# Patient Record
Sex: MALE | URBAN - METROPOLITAN AREA
[De-identification: names, ages, dates, MRNs, and addresses within clinical notes are randomized per-mention and may not be internally consistent; named-entity substitution may affect disease eponyms.]

---

## 2024-05-25 ENCOUNTER — INPATIENT (INPATIENT)
Facility: HOSPITAL | Age: 49
LOS: 0 days | Discharge: HOME | DRG: 712 | End: 2024-05-25
Attending: UROLOGY | Admitting: UROLOGY
Payer: COMMERCIAL

## 2024-05-25 ENCOUNTER — EMERGENCY (EMERGENCY)
Facility: HOSPITAL | Age: 49
LOS: 1 days | Discharge: ANOTHER HOSPITAL | End: 2024-05-25
Attending: EMERGENCY MEDICINE | Admitting: EMERGENCY MEDICINE
Payer: COMMERCIAL

## 2024-05-25 ENCOUNTER — TRANSCRIPTION ENCOUNTER (OUTPATIENT)
Age: 49
End: 2024-05-25

## 2024-05-25 ENCOUNTER — APPOINTMENT (OUTPATIENT)
Dept: AFTER HOURS CARE | Facility: EMERGENCY ROOM | Age: 49
End: 2024-05-25

## 2024-05-25 VITALS
HEART RATE: 68 BPM | RESPIRATION RATE: 17 BRPM | OXYGEN SATURATION: 96 % | DIASTOLIC BLOOD PRESSURE: 55 MMHG | SYSTOLIC BLOOD PRESSURE: 114 MMHG

## 2024-05-25 VITALS
DIASTOLIC BLOOD PRESSURE: 89 MMHG | WEIGHT: 197.09 LBS | OXYGEN SATURATION: 97 % | HEIGHT: 73 IN | HEART RATE: 69 BPM | TEMPERATURE: 98 F | SYSTOLIC BLOOD PRESSURE: 122 MMHG | RESPIRATION RATE: 16 BRPM

## 2024-05-25 VITALS
RESPIRATION RATE: 16 BRPM | TEMPERATURE: 97 F | SYSTOLIC BLOOD PRESSURE: 146 MMHG | OXYGEN SATURATION: 95 % | HEART RATE: 60 BPM | WEIGHT: 195.11 LBS | DIASTOLIC BLOOD PRESSURE: 84 MMHG | HEIGHT: 73 IN

## 2024-05-25 VITALS
HEART RATE: 70 BPM | SYSTOLIC BLOOD PRESSURE: 147 MMHG | TEMPERATURE: 98 F | RESPIRATION RATE: 16 BRPM | DIASTOLIC BLOOD PRESSURE: 77 MMHG | OXYGEN SATURATION: 98 %

## 2024-05-25 DIAGNOSIS — N44.00 TORSION OF TESTIS, UNSPECIFIED: ICD-10-CM

## 2024-05-25 DIAGNOSIS — Z78.9 OTHER SPECIFIED HEALTH STATUS: Chronic | ICD-10-CM

## 2024-05-25 LAB
ANION GAP SERPL CALC-SCNC: 12 MMOL/L — SIGNIFICANT CHANGE UP (ref 9–16)
APPEARANCE UR: CLEAR — SIGNIFICANT CHANGE UP
APTT BLD: 29.9 SEC — SIGNIFICANT CHANGE UP (ref 24.5–35.6)
BASOPHILS # BLD AUTO: 0.03 K/UL — SIGNIFICANT CHANGE UP (ref 0–0.2)
BASOPHILS NFR BLD AUTO: 0.2 % — SIGNIFICANT CHANGE UP (ref 0–2)
BILIRUB UR-MCNC: NEGATIVE — SIGNIFICANT CHANGE UP
BLD GP AB SCN SERPL QL: NEGATIVE — SIGNIFICANT CHANGE UP
BUN SERPL-MCNC: 19 MG/DL — SIGNIFICANT CHANGE UP (ref 7–23)
CALCIUM SERPL-MCNC: 9.1 MG/DL — SIGNIFICANT CHANGE UP (ref 8.5–10.5)
CHLORIDE SERPL-SCNC: 107 MMOL/L — SIGNIFICANT CHANGE UP (ref 96–108)
CO2 SERPL-SCNC: 24 MMOL/L — SIGNIFICANT CHANGE UP (ref 22–31)
COLOR SPEC: YELLOW — SIGNIFICANT CHANGE UP
CREAT SERPL-MCNC: 1.25 MG/DL — SIGNIFICANT CHANGE UP (ref 0.5–1.3)
DIFF PNL FLD: NEGATIVE — SIGNIFICANT CHANGE UP
EGFR: 71 ML/MIN/1.73M2 — SIGNIFICANT CHANGE UP
EOSINOPHIL # BLD AUTO: 0.04 K/UL — SIGNIFICANT CHANGE UP (ref 0–0.5)
EOSINOPHIL NFR BLD AUTO: 0.3 % — SIGNIFICANT CHANGE UP (ref 0–6)
GLUCOSE SERPL-MCNC: 111 MG/DL — HIGH (ref 70–99)
GLUCOSE UR QL: NEGATIVE MG/DL — SIGNIFICANT CHANGE UP
HCT VFR BLD CALC: 42.6 % — SIGNIFICANT CHANGE UP (ref 39–50)
HGB BLD-MCNC: 14.8 G/DL — SIGNIFICANT CHANGE UP (ref 13–17)
IMM GRANULOCYTES NFR BLD AUTO: 0.2 % — SIGNIFICANT CHANGE UP (ref 0–0.9)
INR BLD: 1.05 — SIGNIFICANT CHANGE UP (ref 0.85–1.18)
KETONES UR-MCNC: 80 MG/DL
LEUKOCYTE ESTERASE UR-ACNC: NEGATIVE — SIGNIFICANT CHANGE UP
LYMPHOCYTES # BLD AUTO: 19.8 % — SIGNIFICANT CHANGE UP (ref 13–44)
LYMPHOCYTES # BLD AUTO: 2.43 K/UL — SIGNIFICANT CHANGE UP (ref 1–3.3)
MCHC RBC-ENTMCNC: 31.9 PG — SIGNIFICANT CHANGE UP (ref 27–34)
MCHC RBC-ENTMCNC: 34.7 GM/DL — SIGNIFICANT CHANGE UP (ref 32–36)
MCV RBC AUTO: 91.8 FL — SIGNIFICANT CHANGE UP (ref 80–100)
MONOCYTES # BLD AUTO: 0.65 K/UL — SIGNIFICANT CHANGE UP (ref 0–0.9)
MONOCYTES NFR BLD AUTO: 5.3 % — SIGNIFICANT CHANGE UP (ref 2–14)
NEUTROPHILS # BLD AUTO: 9.11 K/UL — HIGH (ref 1.8–7.4)
NEUTROPHILS NFR BLD AUTO: 74.2 % — SIGNIFICANT CHANGE UP (ref 43–77)
NITRITE UR-MCNC: NEGATIVE — SIGNIFICANT CHANGE UP
NRBC # BLD: 0 /100 WBCS — SIGNIFICANT CHANGE UP (ref 0–0)
PH UR: 6 — SIGNIFICANT CHANGE UP (ref 5–8)
PLATELET # BLD AUTO: 223 K/UL — SIGNIFICANT CHANGE UP (ref 150–400)
POTASSIUM SERPL-MCNC: 3.9 MMOL/L — SIGNIFICANT CHANGE UP (ref 3.5–5.3)
POTASSIUM SERPL-SCNC: 3.9 MMOL/L — SIGNIFICANT CHANGE UP (ref 3.5–5.3)
PROT UR-MCNC: SIGNIFICANT CHANGE UP MG/DL
PROTHROM AB SERPL-ACNC: 11.8 SEC — SIGNIFICANT CHANGE UP (ref 9.5–13)
RBC # BLD: 4.64 M/UL — SIGNIFICANT CHANGE UP (ref 4.2–5.8)
RBC # FLD: 12.4 % — SIGNIFICANT CHANGE UP (ref 10.3–14.5)
RH IG SCN BLD-IMP: POSITIVE — SIGNIFICANT CHANGE UP
RH IG SCN BLD-IMP: POSITIVE — SIGNIFICANT CHANGE UP
SODIUM SERPL-SCNC: 143 MMOL/L — SIGNIFICANT CHANGE UP (ref 132–145)
SP GR SPEC: 1.02 — SIGNIFICANT CHANGE UP (ref 1–1.03)
UROBILINOGEN FLD QL: 1 MG/DL — SIGNIFICANT CHANGE UP (ref 0.2–1)
WBC # BLD: 12.29 K/UL — HIGH (ref 3.8–10.5)
WBC # FLD AUTO: 12.29 K/UL — HIGH (ref 3.8–10.5)

## 2024-05-25 PROCEDURE — 87086 URINE CULTURE/COLONY COUNT: CPT

## 2024-05-25 PROCEDURE — 86901 BLOOD TYPING SEROLOGIC RH(D): CPT

## 2024-05-25 PROCEDURE — 99285 EMERGENCY DEPT VISIT HI MDM: CPT | Mod: 25

## 2024-05-25 PROCEDURE — 99285 EMERGENCY DEPT VISIT HI MDM: CPT

## 2024-05-25 PROCEDURE — 96374 THER/PROPH/DIAG INJ IV PUSH: CPT

## 2024-05-25 PROCEDURE — 93005 ELECTROCARDIOGRAM TRACING: CPT

## 2024-05-25 PROCEDURE — 76870 US EXAM SCROTUM: CPT

## 2024-05-25 PROCEDURE — 86850 RBC ANTIBODY SCREEN: CPT

## 2024-05-25 PROCEDURE — 93010 ELECTROCARDIOGRAM REPORT: CPT

## 2024-05-25 PROCEDURE — 81003 URINALYSIS AUTO W/O SCOPE: CPT

## 2024-05-25 PROCEDURE — 99291 CRITICAL CARE FIRST HOUR: CPT

## 2024-05-25 PROCEDURE — 76870 US EXAM SCROTUM: CPT | Mod: 26

## 2024-05-25 PROCEDURE — 86900 BLOOD TYPING SEROLOGIC ABO: CPT

## 2024-05-25 PROCEDURE — 54640 ORCHIOPEXY INGUN/SCROT APPR: CPT | Mod: RT

## 2024-05-25 RX ORDER — SODIUM CHLORIDE 9 MG/ML
1000 INJECTION INTRAMUSCULAR; INTRAVENOUS; SUBCUTANEOUS
Refills: 0 | Status: DISCONTINUED | OUTPATIENT
Start: 2024-05-25 | End: 2024-05-25

## 2024-05-25 RX ORDER — CIPROFLOXACIN LACTATE 400MG/40ML
1 VIAL (ML) INTRAVENOUS
Qty: 14 | Refills: 0
Start: 2024-05-25 | End: 2024-05-31

## 2024-05-25 RX ORDER — KETOROLAC TROMETHAMINE 30 MG/ML
15 SYRINGE (ML) INJECTION ONCE
Refills: 0 | Status: DISCONTINUED | OUTPATIENT
Start: 2024-05-25 | End: 2024-05-25

## 2024-05-25 RX ORDER — ONDANSETRON 8 MG/1
4 TABLET, FILM COATED ORAL ONCE
Refills: 0 | Status: COMPLETED | OUTPATIENT
Start: 2024-05-25 | End: 2024-05-25

## 2024-05-25 RX ORDER — ACETAMINOPHEN 500 MG
650 TABLET ORAL EVERY 6 HOURS
Refills: 0 | Status: DISCONTINUED | OUTPATIENT
Start: 2024-05-25 | End: 2024-05-25

## 2024-05-25 RX ORDER — MORPHINE SULFATE 50 MG/1
4 CAPSULE, EXTENDED RELEASE ORAL ONCE
Refills: 0 | Status: DISCONTINUED | OUTPATIENT
Start: 2024-05-25 | End: 2024-05-25

## 2024-05-25 RX ADMIN — ONDANSETRON 4 MILLIGRAM(S): 8 TABLET, FILM COATED ORAL at 05:26

## 2024-05-25 RX ADMIN — MORPHINE SULFATE 4 MILLIGRAM(S): 50 CAPSULE, EXTENDED RELEASE ORAL at 06:58

## 2024-05-25 RX ADMIN — Medication 15 MILLIGRAM(S): at 05:25

## 2024-05-25 RX ADMIN — MORPHINE SULFATE 4 MILLIGRAM(S): 50 CAPSULE, EXTENDED RELEASE ORAL at 06:50

## 2024-05-25 NOTE — ED PROVIDER NOTE - CLINICAL SUMMARY MEDICAL DECISION MAKING FREE TEXT BOX
49 healthy M transferred from [facility] for R testicular pain since 230am that woke him from sleep.  sent urgently for r/o torsion.  on exam pt uncomfortable but nad, abd soft/nt, taken to sonogram prior to  exam and uro aware of pt.  morphine for pain and preoped

## 2024-05-25 NOTE — PROGRESS NOTE ADULT - ASSESSMENT
49M with no significant PMH found to have R testicular torsion on scrotal ultrasound but not R no torsion evident intra-op s/p R orchiopexy. POC wnl.    Plan:  Patient passed TOV and would like to go home   CIpro for 7 days for epididymo-orchitis coverage   Scrotal support  Motrin/tylenol prn for pain  F/u in [facility] in 2 weeks   Return precautions

## 2024-05-25 NOTE — DISCHARGE NOTE PROVIDER - NSDCCPCAREPLAN_GEN_ALL_CORE_FT
PRINCIPAL DISCHARGE DIAGNOSIS  Diagnosis: Right testicular pain  Assessment and Plan of Treatment:

## 2024-05-25 NOTE — DISCHARGE NOTE NURSING/CASE MANAGEMENT/SOCIAL WORK - NSDCPEFALRISK_GEN_ALL_CORE
For information on Fall & Injury Prevention, visit: [email or web address] OR  [email or web address] OR  https://www.cdc.gov/steadi/patient.html

## 2024-05-25 NOTE — ED PROVIDER NOTE - ATTENDING APP SHARED VISIT CONTRIBUTION OF CARE
49M c/o sudden onset right testicular pain tonight. states awoke with pain. no fevers, +nausea. pt seen at [facility], exam concerning for no cremasteric reflex and abnormal lie. pt transferred for US and  evaluation to r/o torsion  gen- nad  abd - soft, nt, nd  ext -wwp  neuro -aox3,gomez  pending US to evaluate for testicular torsion

## 2024-05-25 NOTE — PROGRESS NOTE ADULT - SUBJECTIVE AND OBJECTIVE BOX
SUBJECTIVE: Seen and evaluated at bedside with wife present. Resting comfortably, denies any pain, nausea, vomiting fever, dysuria, urgency. frequency.       MEDICATIONS  (STANDING):  sodium chloride 0.9%. 1000 milliLiter(s) (100 mL/Hr) IV Continuous <Continuous>    MEDICATIONS  (PRN):  acetaminophen     Tablet .. 650 milliGRAM(s) Oral every 6 hours PRN Temp greater or equal to 38C (100.4F), Mild Pain (1 - 3), Moderate Pain (4 - 6)      Vital Signs Last 24 Hrs  T(C): 36.2 (25 May 2024 09:46), Max: 36.4 (25 May 2024 05:07)  T(F): 97.2 (25 May 2024 09:46), Max: 97.6 (25 May 2024 05:41)  HR: 68 (25 May 2024 11:30) (60 - 83)  BP: 114/55 (25 May 2024 11:30) (106/63 - 147/77)  BP(mean): 76 (25 May 2024 11:30) (75 - 83)  RR: 17 (25 May 2024 11:30) (11 - 17)  SpO2: 96% (25 May 2024 11:30) (95% - 99%)    Parameters below as of 25 May 2024 10:44  Patient On (Oxygen Delivery Method): nasal cannula  O2 Flow (L/min): 2      Physical Exam:  General: NAD, resting comfortably in bed  Pulmonary: Nonlabored breathing, no respiratory distress  Cardiovascular: NSR  Abdominal: soft, NT/ND  Extremities: WWP, normal strength  Neuro: A/O x 3, CNs II-XII grossly intact, no focal deficits, normal motor/sensation  Pulses: palpable distal pulses    I&O's Summary    25 May 2024 07:01  -  25 May 2024 12:23  --------------------------------------------------------  IN: 700 mL / OUT: 300 mL / NET: 400 mL        LABS:                        14.8   12.29 )-----------( 223      ( 25 May 2024 05:18 )             42.6         143  |  107  |  19  ----------------------------<  111<H>  3.9   |  24  |  1.25    Ca    9.1      25 May 2024 05:18      PT/INR - ( 25 May 2024 05:18 )   PT: 11.8 sec;   INR: 1.05          PTT - ( 25 May 2024 05:18 )  PTT:29.9 sec  Urinalysis Basic - ( 25 May 2024 09:26 )    Color: Yellow / Appearance: Clear / S.021 / pH: x  Gluc: x / Ketone: 80 mg/dL  / Bili: Negative / Urobili: 1.0 mg/dL   Blood: x / Protein: Trace mg/dL / Nitrite: Negative   Leuk Esterase: Negative / RBC: x / WBC x   Sq Epi: x / Non Sq Epi: x / Bacteria: x      CAPILLARY BLOOD GLUCOSE            RADIOLOGY & ADDITIONAL STUDIES:

## 2024-05-25 NOTE — H&P ADULT - ASSESSMENT
49M with no PMHx with acute onset r testicular pain associated with testicular swelling, high riding testis with congruent exam. US findings c/w R testicualr torsion. Patient NPO since 7PM 5/24.     Plan:  Admit to Urology-Dr. Carr  NPO  EKG and stat labs  Add on Class I for scrotal exploration, r orchiopexy poss orchiectomy, left orhiopexy     49M with no PMHx with acute onset r testicular pain associated with testicular swelling, high riding testis with congruent exam. US findings c/w R testicualr torsion. Patient NPO since 7PM 5/24.     Plan:  Admit to Urology-Dr. Carr  NPO  EKG and stat labs  Add on Class I for scrotal exploration, r orchiopexy poss R orchiectomy, left orchiopexy

## 2024-05-25 NOTE — BRIEF OPERATIVE NOTE - NSICDXBRIEFPROCEDURE_GEN_ALL_CORE_FT
Plan: Patient advised to avoid hot showers. If there is still itching after over a week, call office for medication for itching. Detail Level: Zone Initiate Treatment: Mometasone BID 2-4 weeks Render In Strict Bullet Format?: No PROCEDURES:  Scrotal exploration 25-May-2024 09:47:37  Carmel Sahni  Right orchiopexy by scrotal approach 25-May-2024 09:47:43  Carmel Sahni   PROCEDURES:  Scrotal exploration 25-May-2024 09:47:37  Carmel Sahni  Left orchiopexy by scrotal approach 25-May-2024 10:58:27  Carmel Sahni

## 2024-05-25 NOTE — DISCHARGE NOTE PROVIDER - CARE PROVIDER_API CALL
Suzanna Carr   Urology  [address] [place] [id number]  Phone: [phone]  Fax: [phone]  Follow Up Time:

## 2024-05-25 NOTE — ED PROVIDER NOTE - PROGRESS NOTE DETAILS
Discussed case with ED attending Dr. Reilly who is excepting patient ER to ER for stat ultrasound to rule out torsion.  I also discussed the case with Dr. Carr urology attending on call agrees with plan to transfer patient ER to ER for  stat ultrasound. Discussed case with ED attending Dr. Reilly who is accepting patient ER to ER for stat ultrasound to rule out torsion.  I also discussed the case with Dr. Carr urology attending on call agrees with plan to transfer patient ER to ER for  stat ultrasound.    Patient will be transferred ED to ED TIER 1

## 2024-05-25 NOTE — PRE-ANESTHESIA EVALUATION ADULT - NSANTHOSAYNRD_GEN_A_CORE
No. OLIVIER screening performed.  STOP BANG Legend: 0-2 = LOW Risk; 3-4 = INTERMEDIATE Risk; 5-8 = HIGH Risk

## 2024-05-25 NOTE — H&P ADULT - NSHPLABSRESULTS_GEN_ALL_CORE
******PRELIMINARY REPORT******      ******PRELIMINARY REPORT******         ACC: [id number] EXAM:  US SCROTUM AND CONTENTS   ORDERED BY: ABBEY MARK     PROCEDURE DATE:  05/25/2024    ******PRELIMINARY REPORT******      ******PRELIMINARY REPORT******           INTERPRETATION:  CLINICAL INFORMATION: Right testicular pain. Rule out   torsion.    COMPARISON: None available.    TECHNIQUE: Testicular ultrasound utilizing color and spectral Doppler.    FINDINGS:    RIGHT:  Right testis: 5.5 cm x 3.1 cm x 3.7 cm. Normal echogenicity and   echotexture with no masses or areas of architectural distortion.   Decreased vascularity. No arterial or venous waveforms detected. There is   twisting of the right spermatic cord.  Right epididymis: Heterogeneous with decreased vascularity.  Right hydrocele: Large right hydrocele containing low-level internal   echoes.  Right varicocele: Not evaluated.    LEFT:  Left testis: 5.2 cm x 2.4 cm x 4.1 cm. Normal echogenicity and   echotexture with no masses or areasof architectural distortion. Normal   arterial and venous blood flow pattern.  Left epididymis: Within normal limits.  Left hydrocele: None.  Left varicocele: Not evaluated.      IMPRESSION:    1.  There is twisting of the right spermatic cord with decreased   vascularity of the right testis, consistent with right-sided testicular   torsion.  2.  Right hydrocele containing low-level internal echoes, possibly   representing hematocele.    Findings were discussed with Dr. ABBEY MARK [phone] 5/25/2024 6:49 AM   by Dr. Bethea.        ******PRELIMINARY REPORT******      ******PRELIMINARY REPORT******       AYESHA BETHEA MD; Resident Radiologist  This document is a PRELIMINARY interpretation and is pending final   attending approval. May [id number]  6:54AM      LABS:                        14.8   12.29 )-----------( 223      ( 25 May 2024 05:18 )             42.6     05-25    143  |  107  |  19  ----------------------------<  111<H>  3.9   |  24  |  1.25    Ca    9.1      25 May 2024 05:18      PT/INR - ( 25 May 2024 05:18 )   PT: 11.8 sec;   INR: 1.05          PTT - ( 25 May 2024 05:18 )  PTT:29.9 sec

## 2024-05-25 NOTE — BRIEF OPERATIVE NOTE - NSICDXBRIEFPREOP_GEN_ALL_CORE_FT
PRE-OP DIAGNOSIS:  Left testicular torsion 25-May-2024 09:47:59 Rule out left testicular torsion Carmel Sahni   PRE-OP DIAGNOSIS:  Right testicular torsion 25-May-2024 12:08:35 r/o R torsion Carmel Sahni

## 2024-05-25 NOTE — H&P ADULT - NSHPPHYSICALEXAM_GEN_ALL_CORE
Gen: Well groomed, NAD  HEENT: EMOI, Pupils PERRL, MMM, Normal ROM Neck  CV: NSR  Pulm: Normal WOB, No Diff Breathing, CTAB  Abd: S/NT/ND  : Circumcised  penis, orthotopic meatus patent, L testicle palpable and WNL in dependent scrotum, R Testis palpable, high riding and edematous, tender to palpation. -Prehn's sign, No overlying scrotal skin changes  MSK: 5/5 strength and ROM in all 4 extr  Neuro: WNL, CN2-12 intact  Psych: Appropriate mood and behavior

## 2024-05-25 NOTE — ED PROVIDER NOTE - OBJECTIVE STATEMENT
49-year-old male no significant past medical history who presents with an acute onset of right testicular pain that awoke him from sleep at 2:30 in the morning.  Patient went to sleep late at 1:30 AM with no testicular pain or discomfort.  Denies UTI symptoms.  Patient notes swelling and 10 out of 10 pain to the right testicle with associated nausea but no vomiting no diarrhea denies abdominal pelvic pain.  Denies recent trauma.  Denies history of torsion, denies  history.  Denies history of  surgery.

## 2024-05-25 NOTE — ED PROVIDER NOTE - PHYSICAL EXAMINATION
Vitals reviewed  Gen: uncomfortable appearing but nad, speaking in full sentences  Skin: wwp, no rash/lesions  HEENT: ncat, eomi, mmm, airway patent   CV: rrr, no audible m/r/g  Resp: symmetrical expansion, ctab, no w/r/r  Abd: nondistended, soft/nt   exam deferred to uro as unable to eval prior to sono   Ext: FROM throughout, no peripheral edema, no muscle or joint ttp, distal pulses 2+  Neuro: alert/oriented, no focal deficits, steady gait

## 2024-05-25 NOTE — H&P ADULT - HISTORY OF PRESENT ILLNESS
49M with no significant PMHx who presented to [facility] at 5am after noting acute onset R testicular pain at 230am this morning. Per patient he went to sleep at 1am with no problems and awoke at 230am with excruciating 8-10/10 pain in right testis, noted that it was 3-4 times larger than left testicle, and was higher than left testicle. Notes that he has never experienced this in the past. Denies any recent scrotal trauma. Did ride bicycle yesterday but states he rides a [employer] bike most every day of the week. No straddle injury per patient. Denies any recent illnesses, fevers/chills, sick contacts. Denies urinary symptoms like frequency, urgency, dysuria, hematuria, nocturia. Urinated prior to coming to ED without issue. States that he has never had STI now or in the past. Denies any prior urinary infections. Denies any urologic problems in past. Never seen by urologist.

## 2024-05-25 NOTE — ED PROVIDER NOTE - OBJECTIVE STATEMENT
49 healthy M transferred from [facility] for R testicular pain since 230am.  pt reports excruciating pain to R teste waking him from sleep around 230am and has been constant since.  occasionally radiates to R groin.  noted swelling R scrotum.  denies any trauma to area.  was given toradol/zofran at [facility] and urgently transferred for sonogram.  denies f/c, dizziness, fainting, chest pain, sob, abd pain, vd, constipation, urinary sxs, penile dc, trauma

## 2024-05-25 NOTE — DISCHARGE NOTE PROVIDER - HOSPITAL COURSE
50yo male presneted with acute testicular pain. Ultrasound showing "twisting of the right spermatic cord with decreased vascularity of the right testis." Patient was emergently brought to the OR for scrotal exploration. Right orchiopexy performed. Patient's VSS and he is hemodynamically stable. Patient is optimized for discharge with outpatient follow up.

## 2024-05-25 NOTE — ED PROVIDER NOTE - CLINICAL SUMMARY MEDICAL DECISION MAKING FREE TEXT BOX
49-year-old male no significant past medical history who presents with an acute onset of right testicular pain that awoke him from sleep at 2:30 in the morning.  Patient went to sleep late at 1:30 AM with no testicular pain or discomfort.  Denies UTI symptoms.  Patient notes swelling and 10 out of 10 pain to the right testicle with associated nausea but no vomiting no diarrhea denies abdominal pelvic pain.  Denies recent trauma.  Denies history of torsion, denies  history.  Denies history of  surgery.    Based on patient's documented exam I am highly suspicious for testicular torsion.  At this time there is no ultrasound to rule out torsion and will transfer the patient ER to ER for stat ultrasound and possibly urology consult.  Patient consented to transfer understands risks understands benefits.  Has no questions at this time he is otherwise hemodynamically stable for transfer.  Basic labs were sent IV security given Toradol and Zofran.

## 2024-05-25 NOTE — BRIEF OPERATIVE NOTE - NSICDXBRIEFPOSTOP_GEN_ALL_CORE_FT
POST-OP DIAGNOSIS:  Disorder of epididymis 25-May-2024 09:49:31 left, possible orichitis Carmel Sahni   POST-OP DIAGNOSIS:  Disorder of epididymis 25-May-2024 09:49:31 right possible orichitis Carmel Sahni

## 2024-05-25 NOTE — DISCHARGE NOTE NURSING/CASE MANAGEMENT/SOCIAL WORK - PATIENT PORTAL LINK FT
You can access the FollowMyHealth Patient Portal offered by [facility] by registering at the following website: [email or web address]. By joining [employer]’s FollowMyHealth portal, you will also be able to view your health information using other applications (apps) compatible with our system.

## 2024-05-26 LAB
CULTURE RESULTS: NO GROWTH — SIGNIFICANT CHANGE UP
SPECIMEN SOURCE: SIGNIFICANT CHANGE UP

## 2024-05-27 DIAGNOSIS — N50.811 RIGHT TESTICULAR PAIN: ICD-10-CM

## 2024-05-27 DIAGNOSIS — R11.0 NAUSEA: ICD-10-CM

## 2024-05-30 DIAGNOSIS — N50.811 RIGHT TESTICULAR PAIN: ICD-10-CM

## 2024-05-30 DIAGNOSIS — N45.2 ORCHITIS: ICD-10-CM

## 2024-06-03 PROBLEM — Z00.00 ENCOUNTER FOR PREVENTIVE HEALTH EXAMINATION: Status: ACTIVE | Noted: 2024-06-03

## 2024-06-07 ENCOUNTER — APPOINTMENT (OUTPATIENT)
Dept: UROLOGY | Facility: CLINIC | Age: 49
End: 2024-06-07

## 2024-06-07 ENCOUNTER — APPOINTMENT (OUTPATIENT)
Dept: UROLOGY | Facility: CLINIC | Age: 49
End: 2024-06-07
Payer: COMMERCIAL

## 2024-06-07 VITALS
TEMPERATURE: 97.5 F | DIASTOLIC BLOOD PRESSURE: 81 MMHG | BODY MASS INDEX: 23.03 KG/M2 | WEIGHT: 170 LBS | SYSTOLIC BLOOD PRESSURE: 120 MMHG | OXYGEN SATURATION: 99 % | HEIGHT: 72 IN | HEART RATE: 67 BPM

## 2024-06-07 PROCEDURE — 99203 OFFICE O/P NEW LOW 30 MIN: CPT

## 2024-06-07 NOTE — HISTORY OF PRESENT ILLNESS
[FreeTextEntry1] : 49 M with PMH of L torsion read on on testicular ultrasound s/p L scrotal exploration and left orchiopexy at [facility] 5/25/24 presents for f/u. Reports doing well since being discharged from the hospital. Denies pain, dysuria, urgency, frequency, hematuria. Continues to wear scrotal support. Reports resolution of scrotal swelling and has resumed normal life. Reports taking prescribed abx as prescribed. Denies urinary symptoms. Reports having good erections.   Unsure if had prior PSA.

## 2024-06-07 NOTE — PHYSICAL EXAM
[Normal Appearance] : normal appearance [Well Groomed] : well groomed [General Appearance - In No Acute Distress] : no acute distress [Edema] : no peripheral edema [Respiration, Rhythm And Depth] : normal respiratory rhythm and effort [Exaggerated Use Of Accessory Muscles For Inspiration] : no accessory muscle use [Abdomen Soft] : soft [Abdomen Tenderness] : non-tender [Costovertebral Angle Tenderness] : no ~M costovertebral angle tenderness [Urinary Bladder Findings] : the bladder was normal on palpation [Normal Station and Gait] : the gait and station were normal for the patient's age [] : no rash [No Focal Deficits] : no focal deficits [Affect] : the affect was normal [Oriented To Time, Place, And Person] : oriented to person, place, and time [Mood] : the mood was normal [No Palpable Adenopathy] : no palpable adenopathy [de-identified] : L scrotal incision cdi, well healed, no overlying ecchymosis, slight edema improving, b/l testicles in depedent positoin

## 2024-06-07 NOTE — ASSESSMENT
[FreeTextEntry1] : 49M with no prior  hx presented with L testicular pain, read on testicular ultrasound as L torsion s/p L scrotal exploration and L orchiopexy, presenting for post op f/u. Doing well since being discharged. Discussed PSA screening guidelines, and initiating screening age 45-50. discussed pros and cons of screening, patient interested in obtaining PSA in the future, but after recovers from this episode. Patient unsure if he has PSA checked in the past. Patient is interested to get one in 6 months. He will follow-up then.  Recs: F/u in 6 months for wound check  Get PSA in office in 6 months

## (undated) DEVICE — SYR CONTROL LUER LOK 10CC

## (undated) DEVICE — ELCTR BIPOLAR CORD 12FT

## (undated) DEVICE — SUPP ATHLETIC MALE XLG 44-55IN

## (undated) DEVICE — SUT SILK 4-0 18" TIES

## (undated) DEVICE — SYR LUER LOK 3CC

## (undated) DEVICE — POSITIONER FOAM EGG CRATE ULNAR 2PCS (PINK)

## (undated) DEVICE — WARMING BLANKET UPPER ADULT

## (undated) DEVICE — VESSEL LOOP MAXI-BLUE 0.120" X 16"

## (undated) DEVICE — SUT VICRYL 4-0 27" RB-1 UNDYED

## (undated) DEVICE — VENODYNE/SCD SLEEVE CALF MEDIUM

## (undated) DEVICE — NDL HYPO REGULAR BEVEL 25G X 1.5" (BLUE)

## (undated) DEVICE — GLV 7.5 PROTEXIS (WHITE)

## (undated) DEVICE — PACK GENERAL MINOR